# Patient Record
Sex: MALE | Race: ASIAN | NOT HISPANIC OR LATINO | URBAN - METROPOLITAN AREA
[De-identification: names, ages, dates, MRNs, and addresses within clinical notes are randomized per-mention and may not be internally consistent; named-entity substitution may affect disease eponyms.]

---

## 2020-01-17 ENCOUNTER — HOSPITAL ENCOUNTER (OUTPATIENT)
Dept: FAMILY MEDICINE CLINIC | Facility: CLINIC | Age: 50
Discharge: HOME | End: 2020-01-17
Attending: NURSE PRACTITIONER

## 2020-01-17 ENCOUNTER — OFFICE VISIT CONVERTED (OUTPATIENT)
Dept: FAMILY MEDICINE CLINIC | Facility: CLINIC | Age: 50
End: 2020-01-17
Attending: NURSE PRACTITIONER

## 2020-01-17 LAB
ALBUMIN SERPL-MCNC: 3.8 G/DL (ref 3.5–5)
ALBUMIN/GLOB SERPL: 1 {RATIO} (ref 1.4–2.6)
ALP SERPL-CCNC: 164 U/L (ref 53–128)
ALT SERPL-CCNC: 20 U/L (ref 10–40)
ANION GAP SERPL CALC-SCNC: 19 MMOL/L (ref 8–19)
AST SERPL-CCNC: 16 U/L (ref 15–50)
BASOPHILS # BLD AUTO: 0.03 10*3/UL (ref 0–0.2)
BASOPHILS NFR BLD AUTO: 0.5 % (ref 0–3)
BILIRUB SERPL-MCNC: 0.36 MG/DL (ref 0.2–1.3)
BUN SERPL-MCNC: 14 MG/DL (ref 5–25)
BUN/CREAT SERPL: 14 {RATIO} (ref 6–20)
CALCIUM SERPL-MCNC: 9.1 MG/DL (ref 8.7–10.4)
CHLORIDE SERPL-SCNC: 89 MMOL/L (ref 99–111)
CHOLEST SERPL-MCNC: 131 MG/DL (ref 107–200)
CHOLEST/HDLC SERPL: 3.4 {RATIO} (ref 3–6)
CONV ABS IMM GRAN: 0.01 10*3/UL (ref 0–0.2)
CONV CO2: 27 MMOL/L (ref 22–32)
CONV CREATININE URINE, RANDOM: 20.1 MG/DL (ref 10–300)
CONV IMMATURE GRAN: 0.2 % (ref 0–1.8)
CONV MICROALBUM.,U,RANDOM: 276.5 MG/L (ref 0–20)
CONV TOTAL PROTEIN: 7.5 G/DL (ref 6.3–8.2)
CREAT UR-MCNC: 1.03 MG/DL (ref 0.7–1.2)
DEPRECATED RDW RBC AUTO: 33.2 FL (ref 35.1–43.9)
EOSINOPHIL # BLD AUTO: 0.12 10*3/UL (ref 0–0.7)
EOSINOPHIL # BLD AUTO: 1.8 % (ref 0–7)
ERYTHROCYTE [DISTWIDTH] IN BLOOD BY AUTOMATED COUNT: 12.4 % (ref 11.6–14.4)
EST. AVERAGE GLUCOSE BLD GHB EST-MCNC: 407 MG/DL
GFR SERPLBLD BASED ON 1.73 SQ M-ARVRAT: >60 ML/MIN/{1.73_M2}
GLOBULIN UR ELPH-MCNC: 3.7 G/DL (ref 2–3.5)
GLUCOSE SERPL-MCNC: 634 MG/DL (ref 70–99)
HBA1C MFR BLD: 15.8 % (ref 3.5–5.7)
HCT VFR BLD AUTO: 40.4 % (ref 42–52)
HDLC SERPL-MCNC: 38 MG/DL (ref 40–60)
HGB BLD-MCNC: 12.8 G/DL (ref 14–18)
LDLC SERPL CALC-MCNC: 64 MG/DL (ref 70–100)
LYMPHOCYTES # BLD AUTO: 2.09 10*3/UL (ref 1–5)
LYMPHOCYTES NFR BLD AUTO: 31.9 % (ref 20–45)
MCH RBC QN AUTO: 23.6 PG (ref 27–31)
MCHC RBC AUTO-ENTMCNC: 31.7 G/DL (ref 33–37)
MCV RBC AUTO: 74.5 FL (ref 80–96)
MICROALBUMIN/CREAT UR: 1375.6 MG/G{CRE} (ref 0–25)
MONOCYTES # BLD AUTO: 0.32 10*3/UL (ref 0.2–1.2)
MONOCYTES NFR BLD AUTO: 4.9 % (ref 3–10)
NEUTROPHILS # BLD AUTO: 3.98 10*3/UL (ref 2–8)
NEUTROPHILS NFR BLD AUTO: 60.7 % (ref 30–85)
NRBC CBCN: 0 % (ref 0–0.7)
OSMOLALITY SERPL CALC.SUM OF ELEC: 302 MOSM/KG (ref 273–304)
PLATELET # BLD AUTO: 353 10*3/UL (ref 130–400)
PMV BLD AUTO: 11.2 FL (ref 9.4–12.4)
POTASSIUM SERPL-SCNC: 4.3 MMOL/L (ref 3.5–5.3)
RBC # BLD AUTO: 5.42 10*6/UL (ref 4.7–6.1)
SODIUM SERPL-SCNC: 131 MMOL/L (ref 135–147)
T4 FREE SERPL-MCNC: 1.5 NG/DL (ref 0.9–1.8)
TRIGL SERPL-MCNC: 144 MG/DL (ref 40–150)
TSH SERPL-ACNC: 1.51 M[IU]/L (ref 0.27–4.2)
VLDLC SERPL-MCNC: 29 MG/DL (ref 5–37)
WBC # BLD AUTO: 6.55 10*3/UL (ref 4.8–10.8)

## 2020-03-11 ENCOUNTER — OFFICE VISIT CONVERTED (OUTPATIENT)
Dept: FAMILY MEDICINE CLINIC | Facility: CLINIC | Age: 50
End: 2020-03-11
Attending: NURSE PRACTITIONER

## 2020-06-23 ENCOUNTER — HOSPITAL ENCOUNTER (OUTPATIENT)
Dept: FAMILY MEDICINE CLINIC | Facility: CLINIC | Age: 50
Discharge: HOME | End: 2020-06-23
Attending: NURSE PRACTITIONER

## 2020-06-23 ENCOUNTER — OFFICE VISIT CONVERTED (OUTPATIENT)
Dept: FAMILY MEDICINE CLINIC | Facility: CLINIC | Age: 50
End: 2020-06-23
Attending: NURSE PRACTITIONER

## 2020-06-23 LAB
ANION GAP SERPL CALC-SCNC: 14 MMOL/L (ref 8–19)
BUN SERPL-MCNC: 12 MG/DL (ref 5–25)
BUN/CREAT SERPL: 11 {RATIO} (ref 6–20)
CALCIUM SERPL-MCNC: 8.9 MG/DL (ref 8.7–10.4)
CHLORIDE SERPL-SCNC: 97 MMOL/L (ref 99–111)
CONV CO2: 28 MMOL/L (ref 22–32)
CREAT UR-MCNC: 1.14 MG/DL (ref 0.7–1.2)
GFR SERPLBLD BASED ON 1.73 SQ M-ARVRAT: >60 ML/MIN/{1.73_M2}
GLUCOSE SERPL-MCNC: 317 MG/DL (ref 70–99)
OSMOLALITY SERPL CALC.SUM OF ELEC: 292 MOSM/KG (ref 273–304)
POTASSIUM SERPL-SCNC: 4.3 MMOL/L (ref 3.5–5.3)
SODIUM SERPL-SCNC: 135 MMOL/L (ref 135–147)

## 2020-07-01 ENCOUNTER — OFFICE VISIT CONVERTED (OUTPATIENT)
Dept: CARDIOLOGY | Facility: CLINIC | Age: 50
End: 2020-07-01
Attending: INTERNAL MEDICINE

## 2020-07-17 ENCOUNTER — OFFICE VISIT CONVERTED (OUTPATIENT)
Dept: PULMONOLOGY | Facility: CLINIC | Age: 50
End: 2020-07-17
Attending: PHYSICIAN ASSISTANT

## 2020-07-17 ENCOUNTER — HOSPITAL ENCOUNTER (OUTPATIENT)
Dept: INFUSION THERAPY | Facility: HOSPITAL | Age: 50
Discharge: HOME | End: 2020-07-17
Attending: PHYSICIAN ASSISTANT

## 2020-07-17 LAB
ALBUMIN SERPL-MCNC: 3.1 G/DL (ref 3.5–5)
ALBUMIN/GLOB SERPL: 0.9 {RATIO} (ref 1.4–2.6)
AMYLASE FLD-CCNC: 10 U/L (ref 30–150)
AMYLASE FLD-CCNC: 11 U/L (ref 30–150)
APPEARANCE FLD: ABNORMAL
APPEARANCE FLD: ABNORMAL
COLOR AMN: YELLOW
COLOR AMN: YELLOW
CONV TOTAL PROTEIN: 6.5 G/DL (ref 6.3–8.2)
CRYSTALS FLD MICRO: ABNORMAL
CRYSTALS FLD MICRO: ABNORMAL
EOSINOPHIL NFR FLD MANUAL: 1 %
GLOBULIN UR ELPH-MCNC: 3.4 G/DL (ref 2–3.5)
GLUCOSE FLD-MCNC: 491 MG/DL
GLUCOSE FLD-MCNC: 496 MG/DL
LDH FLD-CCNC: 60 U/L
LDH FLD-CCNC: 62 U/L
LDH SERPL-CCNC: 250 U/L (ref 120–240)
LYMPHOCYTES NFR FLD MANUAL: 77 %
LYMPHOCYTES NFR FLD MANUAL: 86 %
MACROPHAGE FLUID: 1 /100{WBCS}
MACROPHAGE FLUID: 3 /100{WBCS}
MESOTHL CELL NFR FLD MANUAL: 1 /100{WBCS}
MESOTHL CELL NFR FLD MANUAL: 4 /100{WBCS}
MONOCYTES NFR FLD: 6 %
MONOCYTES NFR FLD: 6 %
NEUTROPHILS NFR FLD MANUAL: 16 %
NEUTROPHILS NFR FLD MANUAL: 5 %
PLASMA CELLS NFR FLD: 3 %
PROT FLD-MCNC: 1.4 G/DL (ref 6.3–8.2)
PROT FLD-MCNC: 1.4 G/DL (ref 6.3–8.2)
RBC # FLD AUTO: 1500 /UL
RBC # FLD AUTO: 2000 /UL
SPECIMEN SOURCE: ABNORMAL
SPECIMEN SOURCE: ABNORMAL
WBC # FLD AUTO: 1100 /UL
WBC # FLD AUTO: 608 /UL

## 2020-07-20 LAB — BACTERIA FLD CULT: NORMAL

## 2020-07-21 LAB
AMOXICILLIN+CLAV SUSC ISLT: <=2
AMPICILLIN SUSC ISLT: 8
AMPICILLIN+SULBAC SUSC ISLT: 4
BACTERIA FLD CULT: ABNORMAL
CEFAZOLIN SUSC ISLT: <=4
CEFEPIME SUSC ISLT: <=1
CEFTAZIDIME SUSC ISLT: <=1
CEFTRIAXONE SUSC ISLT: <=1
CEFUROXIME ORAL SUSC ISLT: 8
CEFUROXIME PARENTER SUSC ISLT: 8
CIPROFLOXACIN SUSC ISLT: >=4
ERTAPENEM SUSC ISLT: <=0.5
GENTAMICIN SUSC ISLT: <=1
LEVOFLOXACIN SUSC ISLT: >=8
TETRACYCLINE SUSC ISLT: 2
TMP SMX SUSC ISLT: <=20
TOBRAMYCIN SUSC ISLT: <=1

## 2021-05-13 NOTE — PROGRESS NOTES
Progress Note      Patient Name: Sushma Gusman   Patient ID: [id number]   Sex: Male   YOB: 1970    Primary Care Provider: Adrian GUARDADO   Referring Provider: Adrian GUARDADO    Visit Date: June 23, 2020    Provider: GEO Gayle   Location: [facility]   Location Address: [address] [place] [address], [address]  [id number]   Location Phone: [phone]          Chief Complaint  · Follow up office visit within 14 calendar days of discharge from inpatient status (moderate complexity).      History Of Present Illness  Sushma Gusman is a 50 year old  male who presents for evaluation and treatment of:   FOLLOW UP OFFICE VISIT WITHIN 14 CALENDAR DAYS OF INPATIENT STATUS (MODERATE COMPLEXITY)  Sushma Gusman presents to office for follow up post discharge from inpatient status within 14 calendar days. Patient was contacted within 2 business days via phone conversation. Documentation of that phone contact is present in the patient's electronic chart. Patient was admitted to inpatient facility on 06/07/2020 and discharged 06/10/2020 due to: chf..   Admitting MD: lisa rogers   His discharge summary has been reviewed and placed in the patient's electronic chart.   Patient's problem list is: Allergies, Anxiety, Arthritis, Asthma, Bipolar 1 disorder, Depression, Diabetes, Heart Murmur, High blood pressure, High cholesterol, and Reflux Disease   Patient's outpatient medication list has been reconcilled with the medication list from the discharge summary and has been reviewed with the patient. Current medication list is: Accu-Chek Bea Plus Meter miscellaneous misc, Accu-Chek Bea Plus test strp miscellaneous strip, Adderall XR 20 mg oral capsule,extended release 24hr, Alcohol Prep Pads topical pads, medicated, carvedilol 3.125 mg oral tablet, lancets 30 gauge miscellaneous misc, Lantus Solostar U-100 Insulin 100 unit/mL (3 mL) subcutaneous insulin pen, lisinopril 20 mg oral  "tablet, Novolog Flexpen U-100 Insulin 100 unit/mL (3 mL) subcutaneous insulin pen, Pen Needle 32 gauge x 5/32\" miscellaneous needle, Toprol XL 25 mg oral tablet extended release 24 hr, and Zyvox 600 mg oral tablet      Presents today for an inpatient follow-up.  He had canceled his inpatient follow-up last week and then missed his next appointment last week.  He was admitted into [facility] for acute exacerbation of systolic CHF, respiratory failure, pneumonia with MRSA, and UTI with MRSA and enterococcus faecalis.  He was started on furosemide 40 mg twice daily and Spironolactone 25 mg daily.  He is to continue Zyvox 600 mg twice daily for 10 days.  Coreg was changed to 6.25 twice daily and lisinopril 5 mg once daily.  He was not able to  the furosemide nor Spironolactone after discharge.  He reports increased shortness of breath, chest discomfort lower extremity edema and cough.       Past Medical History  Allergies; Anxiety; Arthritis; Asthma; Bipolar 1 disorder; Depression; Diabetes; Heart Murmur; High blood pressure; High cholesterol; Reflux Disease         Past Surgical History  Colonoscopy         Medication List  Accu-Chek Bea Plus Meter miscellaneous misc; Accu-Chek Bea Plus test strp miscellaneous strip; Alcohol Prep Pads topical pads, medicated; amlodipine 10 mg oral tablet; aspirin 81 mg oral tablet,chewable; atorvastatin 40 mg oral tablet; carvedilol 6.25 mg oral tablet; clonazepam 0.5 mg oral tablet; cyclobenzaprine 10 mg oral tablet; dextroamphetamine oral; lancets 30 gauge miscellaneous misc; Lantus Solostar U-100 Insulin 100 unit/mL (3 mL) subcutaneous insulin pen; lisinopril 5 mg oral tablet; nicotine 21 mg/24 hr transdermal patch 24 hour; Novolog Flexpen U-100 Insulin 100 unit/mL (3 mL) subcutaneous insulin pen; Pen Needle 32 gauge x 5/32\" miscellaneous needle; sertraline 100 mg oral tablet; Toprol XL 25 mg oral tablet extended release 24 hr; Zyvox 600 mg oral tablet         Allergy " "List  NO KNOWN DRUG ALLERGIES       Allergies Reconciled  Family Medical History  Stroke; Heart Disease; Lung cancer; Diabetes         Social History  Alcohol (Current some day); Tobacco (Former)         Immunizations  Name Date Admin   Influenza          Review of Systems  · Constitutional  o Admits  o : fatigue  o Denies  o : fever, chills, body aches, night sweats  · HENT  o Denies  o : headaches, vertigo, lightheadedness, recent head injury  · Cardiovascular  o Admits  o : chest pain, irregular heart beats, lower extremity edema  o Denies  o : rapid heart rate, dyspnea on exertion  · Respiratory  o Admits  o : shortness of breath, wheezing, cough  o Denies  o : productive cough  · Gastrointestinal  o Denies  o : nausea, vomiting, diarrhea, constipation  · Genitourinary  o Denies  o : dysuria, urinary retention  · Endocrine  o Admits  o : polyuria, polydipsia  o Denies  o : cold intolerance, heat intolerance      Vitals  Date Time BP Position Site L\R Cuff Size HR RR TEMP (F) WT  HT  BMI kg/m2 BSA m2 O2 Sat        06/23/2020 12:46 /88 Sitting    96 - R  97.6 156lbs 8oz 5'  5\" 26.04 1.8 94 %          Physical Examination  · Constitutional  o Appearance  o : alert, in no acute distress  · Head and Face  o Head  o :   § Inspection  § : atraumatic, normocephalic  o Face  o :   § Inspection  § : no facial lesions  o HEENT  o : Unremarkable  · Eyes  o Conjunctivae  o : conjunctivae normal  o Sclerae  o : sclerae white  o Pupils and Irises  o : pupils equal and round, pupils reactive to light bilaterally  o Eyelids/Ocular Adnexae  o : eyelid appearance normal  · Ears, Nose, Mouth and Throat  o Ears  o :   § External Ears  § : appearance within normal limits, no lesions present  o Nose  o :   § External Nose  § : appearance normal  o Oral Cavity  o :   § Oral Mucosa  § : oral mucosa normal  § Lips  § : lip appearance normal  § Teeth  § : dentition poor   § Gums  § : gums pink, non-swollen, no bleeding " present  § Tongue  § : tongue appearance normal  § Palate  § : hard palate normal, soft palate appearance normal  · Neck  o Inspection/Palpation  o : normal appearance, no masses or tenderness, trachea midline  o Thyroid  o : gland size normal, nontender, no nodules or masses present on palpation  · Respiratory  o Respiratory Effort  o : breathing unlabored  o Auscultation of Lungs  o : rales present   · Cardiovascular  o Heart  o :   § Auscultation of Heart  § : regular rate, normal rhythm, no murmurs present  o Peripheral Vascular System  o :   § Extremities  § : 1+ edema present, no cyanosis, generalized distal extremity hair loss present, normal capillary refill  · Gastrointestinal  o Abdominal Examination  o : abdomen nontender to palpation, normal bowel sounds, tone normal without rigidity or guarding, no masses present  o Liver and spleen  o : no hepatomegaly present  · Lymphatic  o Neck  o : no lymphadenopathy   o Supraclavicular Nodes  o : no supraclavicular nodes          Assessment  · CHF (congestive heart failure)     428.0/I50.9  Refill medications that were prescribed from the hospital furosemide 40 mg twice daily and Spironolactone 25 mg daily. Monitor weight daily.  · Pneumonia     486/J18.9  Finish Zyvox antibiotic  · Chronic respiratory failure     518.83/J96.10  He is on home O2 2 L nasal cannula at night  · NYA (acute kidney injury)     584.9/N17.9  check [facility] today    Problems Reconciled  Plan  · Orders  o BMP [facility] ([address]) - 428.0/I50.9 - 06/23/2020  o ACO-39: Current medications updated and reviewed () - - 06/23/2020  o Discharge medications reconciled with the current medication list (1111F) - - 06/23/2020  · Medications  o furosemide 40 mg oral tablet   SIG: take 1 tablet (40 mg) by oral route 2 times per day for 30 days   DISP: (60) tablets with 0 refills  Prescribed on 06/23/2020     o Aldactone 25 mg oral tablet   SIG: take 1 tablet (25 mg) by oral route once daily   DISP: (30) tablets  with 2 refills  Prescribed on 06/23/2020     o carvedilol 6.25 mg oral tablet   SIG: take 1 tablet (6.25 mg) by oral route 2 times per day with food for 30 days   DISP: (60) tablets with 2 refills  Refilled on 06/23/2020     o lisinopril 5 mg oral tablet   SIG: take 5 mg (1/4 tab) po qday   DISP: (30) tablets with 2 refills  Refilled on 06/23/2020     o Adderall XR 20 mg oral capsule,extended release 24hr   SIG: take 1 capsule by oral route daily for 30 days   DISP: (30) capsules with 0 refills  Discontinued on 06/23/2020     o Medications have been Reconciled  o Transition of Care or Provider Policy  · Instructions  o Patient was educated/instructed on their diagnosis, treatment and medications prior to discharge from the clinic today.  o Patient instructed to seek medical attention urgently for new or worsening symptoms.  o Call the office with any concerns or questions.  o Patient discharge summary has been reviewed and placed in patient's electronic medical record.  o Patient received a phone call from my office within 2 business days of discharge from inpatient status, and documented within the patient's chart.  o Also patient was seen (face to face) for follow up evaluation within 14 calendar days of discharge from inpatient status for a severe complexity issue.  o Patient was educated on their diagnosis, treatment and any medication changes while being evaluated today.  · Disposition  o Call or Return if symptoms worsen or persist.  o f/u 1 week            Electronically Signed by: Adrian John APRN -Author on June 25, 2020 08:28:26 AM

## 2021-05-13 NOTE — PROGRESS NOTES
Progress Note      Patient Name: Sushma Gusman   Patient ID: [id number]   Sex: Male   YOB: 1970    Primary Care Provider: Adrian GUARDADO   Referring Provider: Adrian GUARDADO    Visit Date: July 1, 2020    Provider: Fermin Coronado MD   Location: [facility]   Location Address: [address] [place] [address]   [place]  [id number]   Location Phone: [phone]          Chief Complaint     CHF.       History Of Present Illness  REFERRING CARE PROVIDER: Adrian GUARDADO   Sushma Gusman is a 50 year old  male with a recent diagnosis of nonischemic cardiomyopathy, chronic hypertension, diabetes, chronic renal insufficiency, and previous substance abuse who has persistent shortness of breath issues, but feeling better in general since his hospital discharge. He has noticed some increased edema and weight gain, as well.   PAST MEDICAL HISTORY: Chronic renal insufficiency; Congestive heart failure; Depression; Diabetes mellitus; Dyslipidemia; Hypertension; Nonischemic cardiomyopathy; Post-traumatic stress disorder/anxiety; Previous substance abuse.   FAMILY HISTORY: Positive for diabetes mellitus, hypertension, and heart disease.   PSYCHOSOCIAL HISTORY: Walks a city block 3x a week. Previously smoked, but quit. Rarely consumes alcohol. Admits mood changes and depression.   CURRENT MEDICATIONS: Aspirin 81 mg daily; spironolactone 25 mg daily; carvedilol 3.125 mg 2 b.i.d.; benzonatate 200 mg q. 8 h.; metoprolol ER 25 mg daily; lisinopril 10 mg daily; furosemide 40 mg b.i.d.; atorvastatin 40 mg daily; NovoLog sliding scale daily; Lantus daily; Adderall 20 mg daily; Linezolid 600 mg b.i.d. Dosage and frequency of the medication(s) reviewed with the patient.       Review of Systems  · Cardiovascular  o Admits  o : swelling (feet, ankles, hands), shortness of breath while walking or lying flat  o Denies  o : palpitations (fast, fluttering, or skipping beats), chest pain  "or angina pectoris   · Respiratory  o Admits  o : chronic or frequent cough, asthma or wheezing      Vitals  Date Time BP Position Site L\R Cuff Size HR RR TEMP (F) WT  HT  BMI kg/m2 BSA m2 O2 Sat HC       07/01/2020 12:41 /98 Sitting    96 - R   160lbs 0oz 5'  5\" 26.63 1.82     07/01/2020 12:41 /94 Sitting    94 - R                 Physical Examination  · Constitutional  o Appearance  o : Awake, alert, in no acute distress.   · Eyes  o Conjunctivae  o : Normal.  · Ears, Nose, Mouth and Throat  o Oral Cavity  o :   § Oral Mucosa  § : Normal.  · Neck  o Inspection/Palpation  o : No JVD. Good carotid upstroke. No thyromegaly.  · Respiratory  o Respiratory  o : Good respiratory effort. Clear to percussion and auscultation.  · Cardiovascular  o Heart  o :   § Auscultation of Heart  § : S1, S2 normal. Regular rate and rhythm without murmurs, gallops, or rubs.  o Peripheral Vascular System  o :   § Extremities  § : +2 lower extremity edema.  · Gastrointestinal  o Abdominal Examination  o : Soft. No tenderness or masses felt. No hepatosplenomegaly. Abdominal aorta is not palpable.  · Labs  o Labs  o : Creatinine 1.14, potassium 4.3.              Assessment     ASSESSMENT & PLAN:    1.  Nonischemic cardiomyopathy, patient with class III symptoms and still with some evidence of volume        overload.  Recommended increasing Lasix to 80 mg b.i.d.  Will also try changing over to        Entresto 24-26 mg.  Counseled him on how to use lisinopril for 36 hours before starting and then will check        a renal panel in 2 weeks.  If patient's ejection fraction is not improved, would be a candidate for        prophylactic ICD, as well.  2.  Hypertension, mildly elevated.  Changing to Entresto.  Will see if this improves.  3.  Diabetes.    4.  Dyslipidemia.    5.  History of polysubstance abuse.  Patient has been off drugs for over a year.      Fermin Coronado MD  JH:[email or web address]             Electronically Signed by: Karie " Og-, Other -Author on July 6, 2020 02:35:44 PM  Electronically Co-signed by: Fermin Coronado MD -Reviewer on July 14, 2020 02:15:52 PM

## 2021-05-15 VITALS
HEIGHT: 65 IN | TEMPERATURE: 97.8 F | WEIGHT: 141.5 LBS | OXYGEN SATURATION: 99 % | DIASTOLIC BLOOD PRESSURE: 104 MMHG | BODY MASS INDEX: 23.57 KG/M2 | HEART RATE: 96 BPM | SYSTOLIC BLOOD PRESSURE: 162 MMHG

## 2021-05-15 VITALS
DIASTOLIC BLOOD PRESSURE: 88 MMHG | HEART RATE: 96 BPM | SYSTOLIC BLOOD PRESSURE: 140 MMHG | WEIGHT: 156.5 LBS | OXYGEN SATURATION: 94 % | TEMPERATURE: 97.6 F | BODY MASS INDEX: 26.08 KG/M2 | HEIGHT: 65 IN

## 2021-05-15 VITALS
HEART RATE: 96 BPM | HEIGHT: 65 IN | WEIGHT: 160 LBS | SYSTOLIC BLOOD PRESSURE: 144 MMHG | DIASTOLIC BLOOD PRESSURE: 98 MMHG | BODY MASS INDEX: 26.66 KG/M2

## 2021-05-15 VITALS
BODY MASS INDEX: 24.07 KG/M2 | WEIGHT: 144.5 LBS | HEART RATE: 108 BPM | SYSTOLIC BLOOD PRESSURE: 130 MMHG | OXYGEN SATURATION: 98 % | DIASTOLIC BLOOD PRESSURE: 98 MMHG | TEMPERATURE: 98 F | HEIGHT: 65 IN

## 2021-05-28 VITALS
RESPIRATION RATE: 20 BRPM | HEART RATE: 97 BPM | OXYGEN SATURATION: 96 % | HEIGHT: 65 IN | SYSTOLIC BLOOD PRESSURE: 154 MMHG | WEIGHT: 174.5 LBS | BODY MASS INDEX: 29.07 KG/M2 | DIASTOLIC BLOOD PRESSURE: 100 MMHG | TEMPERATURE: 98.9 F
